# Patient Record
Sex: MALE | Race: WHITE | NOT HISPANIC OR LATINO | URBAN - METROPOLITAN AREA
[De-identification: names, ages, dates, MRNs, and addresses within clinical notes are randomized per-mention and may not be internally consistent; named-entity substitution may affect disease eponyms.]

---
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
## 2021-09-16 NOTE — ED PEDIATRIC TRIAGE NOTE - LOCATION:
Group Topic: BH Therapeutic Activity    Date: 9/15/2021  Start Time: 1745  End Time: 1830  Facilitators: Sheryl Flynn    Focus: Healthy Leisure  Number in attendance: 3    Pt was invited to participate in healthy leisure group by engaging in a social activity such as board games, card games, and listening to music. Benefits of healthy leisure may include reduction of stress, relaxation, distraction, and overall satisfaction with life.    Pt was recruited for group but did not attend. Efforts to encourage participation in programming on the unit will continue.    Sheryl Flynn, CTRS       Left arm;
